# Patient Record
Sex: FEMALE | URBAN - METROPOLITAN AREA
[De-identification: names, ages, dates, MRNs, and addresses within clinical notes are randomized per-mention and may not be internally consistent; named-entity substitution may affect disease eponyms.]

---

## 2023-04-18 ENCOUNTER — WEB ENCOUNTER (OUTPATIENT)
Dept: URBAN - METROPOLITAN AREA CLINIC 80 | Facility: CLINIC | Age: 73
End: 2023-04-18

## 2023-04-18 ENCOUNTER — OFFICE VISIT (OUTPATIENT)
Dept: URBAN - METROPOLITAN AREA CLINIC 80 | Facility: CLINIC | Age: 73
End: 2023-04-18
Payer: MEDICARE

## 2023-04-18 VITALS — HEIGHT: 64 IN | BODY MASS INDEX: 29.23 KG/M2 | WEIGHT: 171.2 LBS | TEMPERATURE: 97 F

## 2023-04-18 DIAGNOSIS — Z86.010 PERSONAL HISTORY OF COLONIC POLYPS: ICD-10-CM

## 2023-04-18 PROCEDURE — 99203 OFFICE O/P NEW LOW 30 MIN: CPT | Performed by: NURSE PRACTITIONER

## 2023-04-18 RX ORDER — FLUTICASONE PROPIONATE 50 UG/1
USE 2 SPRAY(S) IN EACH NOSTRIL ONCE DAILY SPRAY, METERED NASAL
Qty: 16 GRAM | Refills: 0 | Status: ON HOLD | COMMUNITY

## 2023-04-18 RX ORDER — ERGOCALCIFEROL 1.25 MG/1
CAPSULE, LIQUID FILLED ORAL
Qty: 5 CAPSULE | Status: ACTIVE | COMMUNITY

## 2023-04-18 RX ORDER — MELOXICAM 15 MG/1
TAKE 1 TABLET BY MOUTH ONCE DAILY TABLET ORAL
Qty: 30 EACH | Refills: 0 | Status: ACTIVE | COMMUNITY

## 2023-04-18 RX ORDER — FLASH GLUCOSE SENSOR
USE AS DIRECTED EVERY 14 DAYS KIT MISCELLANEOUS
Qty: 2 EACH | Refills: 6 | Status: ACTIVE | COMMUNITY

## 2023-04-18 RX ORDER — ATORVASTATIN CALCIUM 10 MG/1
TAKE 1 TABLET BY MOUTH ONCE DAILY TABLET, FILM COATED ORAL
Qty: 90 EACH | Refills: 0 | Status: ACTIVE | COMMUNITY

## 2023-04-18 RX ORDER — METFORMIN HYDROCHLORIDE 1000 MG/1
TAKE 1 TABLET BY MOUTH ONCE DAILY WITH BREAKFAST TABLET, FILM COATED ORAL
Qty: 30 EACH | Refills: 1 | Status: ACTIVE | COMMUNITY

## 2023-04-18 RX ORDER — POLYETHYLENE GLYCOL 3350, SODIUM CHLORIDE, SODIUM BICARBONATE, POTASSIUM CHLORIDE 420; 11.2; 5.72; 1.48 G/4L; G/4L; G/4L; G/4L
4000ML POWDER, FOR SOLUTION ORAL
Qty: 4000 MILLILITER | Refills: 0 | OUTPATIENT
Start: 2023-04-18 | End: 2023-04-19

## 2023-04-18 RX ORDER — TRIAZOLAM 0.12 MG/1
TAKE 1 TABLET BY MOUTH 60 TO 90 MINUTES PRIOR TO PROCEDURE AND BRING REMAINING TABLETS TO THE OFFICE TABLET ORAL
Qty: 3 EACH | Refills: 0 | Status: ON HOLD | COMMUNITY

## 2023-04-18 RX ORDER — DULAGLUTIDE 1.5 MG/.5ML
INJECTION, SOLUTION SUBCUTANEOUS
Qty: 2 MILLILITER | Status: ACTIVE | COMMUNITY

## 2023-04-18 RX ORDER — DOXYCYCLINE HYCLATE 100 MG/1
CAPSULE ORAL
Qty: 20 CAPSULE | Status: ON HOLD | COMMUNITY

## 2023-04-18 RX ORDER — TRAMADOL HYDROCHLORIDE 50 MG/1
TABLET ORAL
Qty: 10 TABLET | Status: ON HOLD | COMMUNITY

## 2023-04-18 RX ORDER — CIPROFLOXACIN HYDROCHLORIDE 500 MG/1
TAKE 1 TABLET BY MOUTH TWICE DAILY FOR 10 DAYS TABLET, FILM COATED ORAL
Qty: 20 EACH | Refills: 0 | Status: ON HOLD | COMMUNITY

## 2023-04-18 NOTE — HPI-TODAY'S VISIT:
This patient was referred by Dr. Smita Victoria for an evaluation for colon cancer screening.  A copy of this will be sent to the referring provider.  The last colonoscopy polyps x2- 2017. The patient denies anemia, change in bowel habits, abdominal pain, blood in stool, or weight loss. There is no family history of colon cancer; father had colon polyps. She has had lowerpelvic cramping recently-had pelvic ultrasound with GYN-does not know results.

## 2023-05-31 ENCOUNTER — OFFICE VISIT (OUTPATIENT)
Dept: URBAN - METROPOLITAN AREA SURGERY CENTER 19 | Facility: SURGERY CENTER | Age: 73
End: 2023-05-31
Payer: MEDICARE

## 2023-05-31 ENCOUNTER — CLAIMS CREATED FROM THE CLAIM WINDOW (OUTPATIENT)
Dept: URBAN - METROPOLITAN AREA CLINIC 4 | Facility: CLINIC | Age: 73
End: 2023-05-31
Payer: MEDICARE

## 2023-05-31 DIAGNOSIS — D12.2 BENIGN NEOPLASM OF ASCENDING COLON: ICD-10-CM

## 2023-05-31 DIAGNOSIS — Z83.71 FAMILY HISTORY OF COLON POLYPS: ICD-10-CM

## 2023-05-31 DIAGNOSIS — Z86.010 ADENOMAS PERSONAL HISTORY OF COLONIC POLYPS: ICD-10-CM

## 2023-05-31 DIAGNOSIS — D12.2 ADENOMA OF ASCENDING COLON: ICD-10-CM

## 2023-05-31 PROCEDURE — 88305 TISSUE EXAM BY PATHOLOGIST: CPT | Performed by: PATHOLOGY

## 2023-05-31 PROCEDURE — 45380 COLONOSCOPY AND BIOPSY: CPT | Performed by: INTERNAL MEDICINE

## 2023-05-31 PROCEDURE — G8907 PT DOC NO EVENTS ON DISCHARG: HCPCS | Performed by: INTERNAL MEDICINE

## 2023-06-12 ENCOUNTER — P2P PATIENT RECORD (OUTPATIENT)
Age: 73
End: 2023-06-12

## 2023-06-13 ENCOUNTER — TELEPHONE ENCOUNTER (OUTPATIENT)
Dept: URBAN - METROPOLITAN AREA CLINIC 80 | Facility: CLINIC | Age: 73
End: 2023-06-13

## 2023-06-22 ENCOUNTER — OFFICE VISIT (OUTPATIENT)
Dept: URBAN - METROPOLITAN AREA CLINIC 80 | Facility: CLINIC | Age: 73
End: 2023-06-22
Payer: MEDICARE

## 2023-06-22 VITALS — HEIGHT: 64 IN | TEMPERATURE: 96.9 F | WEIGHT: 169.8 LBS | BODY MASS INDEX: 28.99 KG/M2

## 2023-06-22 DIAGNOSIS — R10.30 LOWER ABDOMINAL PAIN: ICD-10-CM

## 2023-06-22 DIAGNOSIS — Z86.010 HISTORY OF COLON POLYPS: ICD-10-CM

## 2023-06-22 DIAGNOSIS — R93.5 ABNORMAL CT OF THE ABDOMEN: ICD-10-CM

## 2023-06-22 PROBLEM — 428283002: Status: ACTIVE | Noted: 2023-06-22

## 2023-06-22 PROCEDURE — 99214 OFFICE O/P EST MOD 30 MIN: CPT | Performed by: INTERNAL MEDICINE

## 2023-06-22 RX ORDER — ATORVASTATIN CALCIUM 10 MG/1
TAKE 1 TABLET BY MOUTH ONCE DAILY TABLET, FILM COATED ORAL
Qty: 90 EACH | Refills: 0 | Status: ACTIVE | COMMUNITY

## 2023-06-22 RX ORDER — TRIAZOLAM 0.12 MG/1
TAKE 1 TABLET BY MOUTH 60 TO 90 MINUTES PRIOR TO PROCEDURE AND BRING REMAINING TABLETS TO THE OFFICE TABLET ORAL
Qty: 3 EACH | Refills: 0 | Status: ON HOLD | COMMUNITY

## 2023-06-22 RX ORDER — DULAGLUTIDE 1.5 MG/.5ML
INJECTION, SOLUTION SUBCUTANEOUS
Qty: 2 MILLILITER | Status: ACTIVE | COMMUNITY

## 2023-06-22 RX ORDER — FLASH GLUCOSE SENSOR
USE AS DIRECTED EVERY 14 DAYS KIT MISCELLANEOUS
Qty: 2 EACH | Refills: 6 | Status: ACTIVE | COMMUNITY

## 2023-06-22 RX ORDER — ERGOCALCIFEROL 1.25 MG/1
CAPSULE, LIQUID FILLED ORAL
Qty: 5 CAPSULE | Status: ACTIVE | COMMUNITY

## 2023-06-22 RX ORDER — MELOXICAM 15 MG/1
TAKE 1 TABLET BY MOUTH ONCE DAILY TABLET ORAL
Qty: 30 EACH | Refills: 0 | Status: ON HOLD | COMMUNITY

## 2023-06-22 RX ORDER — TRAMADOL HYDROCHLORIDE 50 MG/1
TABLET ORAL
Qty: 10 TABLET | Status: ON HOLD | COMMUNITY

## 2023-06-22 RX ORDER — METFORMIN HYDROCHLORIDE 1000 MG/1
TAKE 1 TABLET BY MOUTH ONCE DAILY WITH BREAKFAST TABLET, FILM COATED ORAL
Qty: 30 EACH | Refills: 1 | Status: ACTIVE | COMMUNITY

## 2023-06-22 RX ORDER — FLUTICASONE PROPIONATE 50 UG/1
USE 2 SPRAY(S) IN EACH NOSTRIL ONCE DAILY SPRAY, METERED NASAL
Qty: 16 GRAM | Refills: 0 | Status: ON HOLD | COMMUNITY

## 2023-06-22 RX ORDER — CIPROFLOXACIN HYDROCHLORIDE 500 MG/1
TAKE 1 TABLET BY MOUTH TWICE DAILY FOR 10 DAYS TABLET, FILM COATED ORAL
Qty: 20 EACH | Refills: 0 | Status: ON HOLD | COMMUNITY

## 2023-06-22 RX ORDER — DOXYCYCLINE HYCLATE 100 MG/1
CAPSULE ORAL
Qty: 20 CAPSULE | Status: ON HOLD | COMMUNITY

## 2023-06-22 NOTE — HPI-TODAY'S VISIT:
Lower abd pain  TVUS- ovaries not noted s/p hysterectomy only remotely CT w contrast had allergic reaction/swelling- given steroids and some additional meds, has been sick every since, glucose has been up and down. CT with gastric thickening

## 2023-06-29 ENCOUNTER — OFFICE VISIT (OUTPATIENT)
Dept: URBAN - METROPOLITAN AREA SURGERY CENTER 19 | Facility: SURGERY CENTER | Age: 73
End: 2023-06-29
Payer: MEDICARE

## 2023-06-29 ENCOUNTER — CLAIMS CREATED FROM THE CLAIM WINDOW (OUTPATIENT)
Dept: URBAN - METROPOLITAN AREA CLINIC 4 | Facility: CLINIC | Age: 73
End: 2023-06-29
Payer: MEDICARE

## 2023-06-29 DIAGNOSIS — K31.89 OTHER DISEASES OF STOMACH AND DUODENUM: ICD-10-CM

## 2023-06-29 DIAGNOSIS — K31.89 ACQUIRED DEFORMITY OF DUODENUM: ICD-10-CM

## 2023-06-29 DIAGNOSIS — R93.3 ABN FINDINGS-GI TRACT: ICD-10-CM

## 2023-06-29 PROCEDURE — G8907 PT DOC NO EVENTS ON DISCHARG: HCPCS | Performed by: INTERNAL MEDICINE

## 2023-06-29 PROCEDURE — 43239 EGD BIOPSY SINGLE/MULTIPLE: CPT | Performed by: INTERNAL MEDICINE

## 2023-06-29 PROCEDURE — 88312 SPECIAL STAINS GROUP 1: CPT | Performed by: PATHOLOGY

## 2023-06-29 PROCEDURE — 88305 TISSUE EXAM BY PATHOLOGIST: CPT | Performed by: PATHOLOGY

## 2023-06-29 RX ORDER — CIPROFLOXACIN HYDROCHLORIDE 500 MG/1
TAKE 1 TABLET BY MOUTH TWICE DAILY FOR 10 DAYS TABLET, FILM COATED ORAL
Qty: 20 EACH | Refills: 0 | Status: ON HOLD | COMMUNITY

## 2023-06-29 RX ORDER — MELOXICAM 15 MG/1
TAKE 1 TABLET BY MOUTH ONCE DAILY TABLET ORAL
Qty: 30 EACH | Refills: 0 | Status: ON HOLD | COMMUNITY

## 2023-06-29 RX ORDER — ATORVASTATIN CALCIUM 10 MG/1
TAKE 1 TABLET BY MOUTH ONCE DAILY TABLET, FILM COATED ORAL
Qty: 90 EACH | Refills: 0 | Status: ACTIVE | COMMUNITY

## 2023-06-29 RX ORDER — TRIAZOLAM 0.12 MG/1
TAKE 1 TABLET BY MOUTH 60 TO 90 MINUTES PRIOR TO PROCEDURE AND BRING REMAINING TABLETS TO THE OFFICE TABLET ORAL
Qty: 3 EACH | Refills: 0 | Status: ON HOLD | COMMUNITY

## 2023-06-29 RX ORDER — FLASH GLUCOSE SENSOR
USE AS DIRECTED EVERY 14 DAYS KIT MISCELLANEOUS
Qty: 2 EACH | Refills: 6 | Status: ACTIVE | COMMUNITY

## 2023-06-29 RX ORDER — METFORMIN HYDROCHLORIDE 1000 MG/1
TAKE 1 TABLET BY MOUTH ONCE DAILY WITH BREAKFAST TABLET, FILM COATED ORAL
Qty: 30 EACH | Refills: 1 | Status: ACTIVE | COMMUNITY

## 2023-06-29 RX ORDER — ERGOCALCIFEROL 1.25 MG/1
CAPSULE, LIQUID FILLED ORAL
Qty: 5 CAPSULE | Status: ACTIVE | COMMUNITY

## 2023-06-29 RX ORDER — TRAMADOL HYDROCHLORIDE 50 MG/1
TABLET ORAL
Qty: 10 TABLET | Status: ON HOLD | COMMUNITY

## 2023-06-29 RX ORDER — DULAGLUTIDE 1.5 MG/.5ML
INJECTION, SOLUTION SUBCUTANEOUS
Qty: 2 MILLILITER | Status: ACTIVE | COMMUNITY

## 2023-06-29 RX ORDER — FLUTICASONE PROPIONATE 50 UG/1
USE 2 SPRAY(S) IN EACH NOSTRIL ONCE DAILY SPRAY, METERED NASAL
Qty: 16 GRAM | Refills: 0 | Status: ON HOLD | COMMUNITY

## 2023-06-29 RX ORDER — DOXYCYCLINE HYCLATE 100 MG/1
CAPSULE ORAL
Qty: 20 CAPSULE | Status: ON HOLD | COMMUNITY

## 2023-07-11 ENCOUNTER — DASHBOARD ENCOUNTERS (OUTPATIENT)
Age: 73
End: 2023-07-11

## 2023-07-11 ENCOUNTER — OFFICE VISIT (OUTPATIENT)
Dept: URBAN - METROPOLITAN AREA CLINIC 80 | Facility: CLINIC | Age: 73
End: 2023-07-11
Payer: MEDICARE

## 2023-07-11 VITALS — TEMPERATURE: 97.9 F | BODY MASS INDEX: 28.68 KG/M2 | WEIGHT: 168 LBS | HEIGHT: 64 IN

## 2023-07-11 DIAGNOSIS — Z86.010 HISTORY OF COLON POLYPS: ICD-10-CM

## 2023-07-11 DIAGNOSIS — R10.9 ABDOMINAL CRAMPING: ICD-10-CM

## 2023-07-11 PROCEDURE — 99214 OFFICE O/P EST MOD 30 MIN: CPT | Performed by: INTERNAL MEDICINE

## 2023-07-11 RX ORDER — DULAGLUTIDE 1.5 MG/.5ML
INJECTION, SOLUTION SUBCUTANEOUS
Qty: 2 MILLILITER | Status: ACTIVE | COMMUNITY

## 2023-07-11 RX ORDER — FLUTICASONE PROPIONATE 50 UG/1
USE 2 SPRAY(S) IN EACH NOSTRIL ONCE DAILY SPRAY, METERED NASAL
Qty: 16 GRAM | Refills: 0 | Status: ON HOLD | COMMUNITY

## 2023-07-11 RX ORDER — MELOXICAM 15 MG/1
TAKE 1 TABLET BY MOUTH ONCE DAILY TABLET ORAL
Qty: 30 EACH | Refills: 0 | Status: ON HOLD | COMMUNITY

## 2023-07-11 RX ORDER — ATORVASTATIN CALCIUM 10 MG/1
TAKE 1 TABLET BY MOUTH ONCE DAILY TABLET, FILM COATED ORAL
Qty: 90 EACH | Refills: 0 | Status: ACTIVE | COMMUNITY

## 2023-07-11 RX ORDER — ERGOCALCIFEROL 1.25 MG/1
CAPSULE, LIQUID FILLED ORAL
Qty: 5 CAPSULE | Status: ACTIVE | COMMUNITY

## 2023-07-11 RX ORDER — FLASH GLUCOSE SENSOR
USE AS DIRECTED EVERY 14 DAYS KIT MISCELLANEOUS
Qty: 2 EACH | Refills: 6 | Status: ACTIVE | COMMUNITY

## 2023-07-11 RX ORDER — CIPROFLOXACIN HYDROCHLORIDE 500 MG/1
TAKE 1 TABLET BY MOUTH TWICE DAILY FOR 10 DAYS TABLET, FILM COATED ORAL
Qty: 20 EACH | Refills: 0 | Status: ON HOLD | COMMUNITY

## 2023-07-11 RX ORDER — DOXYCYCLINE HYCLATE 100 MG/1
CAPSULE ORAL
Qty: 20 CAPSULE | Status: ON HOLD | COMMUNITY

## 2023-07-11 RX ORDER — TRAMADOL HYDROCHLORIDE 50 MG/1
TABLET ORAL
Qty: 10 TABLET | Status: ON HOLD | COMMUNITY

## 2023-07-11 RX ORDER — METFORMIN HYDROCHLORIDE 1000 MG/1
TAKE 1 TABLET BY MOUTH ONCE DAILY WITH BREAKFAST TABLET, FILM COATED ORAL
Qty: 30 EACH | Refills: 1 | Status: ACTIVE | COMMUNITY

## 2023-07-11 RX ORDER — TRIAZOLAM 0.12 MG/1
TAKE 1 TABLET BY MOUTH 60 TO 90 MINUTES PRIOR TO PROCEDURE AND BRING REMAINING TABLETS TO THE OFFICE TABLET ORAL
Qty: 3 EACH | Refills: 0 | Status: ON HOLD | COMMUNITY

## 2023-07-11 RX ORDER — HYOSCYAMINE SULFATE 0.12 MG/1
1 TABLET UNDER THE TONGUE AND ALLOW TO DISSOLVE AS NEEDED TABLET, ORALLY DISINTEGRATING ORAL
Qty: 40 | Refills: 1 | OUTPATIENT
Start: 2023-07-11 | End: 2023-09-09